# Patient Record
Sex: FEMALE | Employment: FULL TIME | URBAN - METROPOLITAN AREA
[De-identification: names, ages, dates, MRNs, and addresses within clinical notes are randomized per-mention and may not be internally consistent; named-entity substitution may affect disease eponyms.]

---

## 2020-12-22 ENCOUNTER — HOSPITAL ENCOUNTER (OUTPATIENT)
Dept: ULTRASOUND IMAGING | Facility: CLINIC | Age: 50
End: 2020-12-22
Attending: SPECIALIST
Payer: COMMERCIAL

## 2020-12-22 DIAGNOSIS — Z11.59 ENCOUNTER FOR SCREENING FOR OTHER VIRAL DISEASES: Primary | ICD-10-CM

## 2020-12-22 DIAGNOSIS — R92.8 ABNORMAL MAMMOGRAM: ICD-10-CM

## 2020-12-22 PROCEDURE — 76642 ULTRASOUND BREAST LIMITED: CPT | Mod: RT

## 2020-12-26 DIAGNOSIS — Z11.59 ENCOUNTER FOR SCREENING FOR OTHER VIRAL DISEASES: ICD-10-CM

## 2020-12-26 LAB
SARS-COV-2 RNA SPEC QL NAA+PROBE: NORMAL
SPECIMEN SOURCE: NORMAL

## 2020-12-26 PROCEDURE — U0003 INFECTIOUS AGENT DETECTION BY NUCLEIC ACID (DNA OR RNA); SEVERE ACUTE RESPIRATORY SYNDROME CORONAVIRUS 2 (SARS-COV-2) (CORONAVIRUS DISEASE [COVID-19]), AMPLIFIED PROBE TECHNIQUE, MAKING USE OF HIGH THROUGHPUT TECHNOLOGIES AS DESCRIBED BY CMS-2020-01-R: HCPCS | Performed by: SPECIALIST

## 2020-12-27 LAB
LABORATORY COMMENT REPORT: NORMAL
SARS-COV-2 RNA SPEC QL NAA+PROBE: NEGATIVE
SPECIMEN SOURCE: NORMAL

## 2020-12-29 ENCOUNTER — HOSPITAL ENCOUNTER (OUTPATIENT)
Dept: MAMMOGRAPHY | Facility: CLINIC | Age: 50
End: 2020-12-29
Attending: SPECIALIST
Payer: COMMERCIAL

## 2020-12-29 ENCOUNTER — HOSPITAL ENCOUNTER (OUTPATIENT)
Dept: ULTRASOUND IMAGING | Facility: CLINIC | Age: 50
End: 2020-12-29
Attending: SPECIALIST
Payer: COMMERCIAL

## 2020-12-29 DIAGNOSIS — R92.8 ABNORMAL MAMMOGRAM: ICD-10-CM

## 2020-12-29 PROCEDURE — 999N001020 HC STATISTIC H-SEND OUTS PREP: Performed by: SPECIALIST

## 2020-12-29 PROCEDURE — 88305 TISSUE EXAM BY PATHOLOGIST: CPT | Mod: 26 | Performed by: PATHOLOGY

## 2020-12-29 PROCEDURE — 999N000065 MA POST PROCEDURE RIGHT

## 2020-12-29 PROCEDURE — 250N000009 HC RX 250: Performed by: RADIOLOGY

## 2020-12-29 PROCEDURE — 88305 TISSUE EXAM BY PATHOLOGIST: CPT | Mod: TC | Performed by: SPECIALIST

## 2020-12-29 PROCEDURE — 88360 TUMOR IMMUNOHISTOCHEM/MANUAL: CPT | Mod: TC | Performed by: SPECIALIST

## 2020-12-29 PROCEDURE — 88342 IMHCHEM/IMCYTCHM 1ST ANTB: CPT | Mod: TC | Performed by: SPECIALIST

## 2020-12-29 PROCEDURE — 88377 M/PHMTRC ALYS ISHQUANT/SEMIQ: CPT | Mod: TC | Performed by: SPECIALIST

## 2020-12-29 PROCEDURE — 999N001019 HC STATISTIC H-FISH PROCESS B/S: Performed by: SPECIALIST

## 2020-12-29 PROCEDURE — 19083 BX BREAST 1ST LESION US IMAG: CPT | Mod: RT

## 2020-12-29 PROCEDURE — 88360 TUMOR IMMUNOHISTOCHEM/MANUAL: CPT | Mod: 26 | Performed by: PATHOLOGY

## 2020-12-29 PROCEDURE — 88377 M/PHMTRC ALYS ISHQUANT/SEMIQ: CPT | Mod: 26 | Performed by: MEDICAL GENETICS

## 2020-12-29 RX ADMIN — LIDOCAINE HYDROCHLORIDE 5 ML: 10 INJECTION, SOLUTION EPIDURAL; INFILTRATION; INTRACAUDAL; PERINEURAL at 11:23

## 2020-12-29 NOTE — DISCHARGE INSTRUCTIONS
Page 1 of 1  For informational purposes only. Not to replace the advice of your health care provider. Copyright   2010 [facility]. All rights reserved. [employer] [id number] - REV 02/16.  After Your Breast Biopsy   Bleeding or bruising  Slight bruising is normal. If you bleed through the bandage, put direct pressure on the breast for 10 minutes.   If the breast begins to swell, or you have a lot of bleeding after 10 minutes of pressure, call the doctor who ordered your exam. Or, go to the emergency room.   Bandages  Keep your bandage in place until tomorrow morning. Do not get it wet.   If you have small pieces of tape on the skin, leave them in place. They will fall off on their own, or you can remove them after 5 days.   Activity  You may shower the morning after the exam. No heavy activity (lifting, vacuuming) on the day of your exam. You may go back to normal activity the next day, unless you had a lot of bleeding or pain.  Discomfort  You may take Tylenol (acetaminophen) today for pain. Tomorrow, you may take an anti-inflammatory medicine (aspirin, ibuprofen, Motrin, Aleve, Advil), unless your doctor tells you not to.  Wear your bra overnight to support the breast. You may also use an ice pack: Place it over the area for 15-20 minutes several times a day.  Infection  Infection is rare. Symptoms include fever, redness, increasing pain and fluid draining from the biopsy site. If you have any of these symptoms, please call the doctor who ordered your exam.  Results  Results may take up to 5 business days. A nurse or doctor from the Breast Center will call with your results. We will also send the results to the doctor who ordered your biopsy.  If you have not heard your results in 5 days, please call the Breast Center.   Other instructions  ______________________________________________________________________________________________________________________________  Call your doctor if:    You have  bleeding that lasts more than 10 minutes.    You have pain that cannot be controlled.   You have signs of infection (fever, redness, drainage or other signs).   You have not received your results within 5 days.    Please call the Breast Center nurse navigator at [phone] if you have questions or concerns about your biopsy.

## 2020-12-31 ENCOUNTER — TELEPHONE (OUTPATIENT)
Dept: MAMMOGRAPHY | Facility: CLINIC | Age: 50
End: 2020-12-31

## 2020-12-31 NOTE — TELEPHONE ENCOUNTER
Pathology report reviewed with our breast radiologist Dr Dozier, who confirmed the recent breast imaging is concordant with the final surgical pathology results below.    I phoned Ms. Aaron, confirmed her full name, date of birth, and notified patient of Ultrasound Guided right Breast Biopsy results:   FINAL DIAGNOSIS:   Breast, right/10:00 - 5 cm from nipple, ultrasound-guided core biopsy:   - Invasive ductal carcinoma, histologic grade 3, basal - like phenotype   (CK 5/6 positive: > 80% of tumor   cells).   - Ductal carcinoma in situ, solid, high nuclear grade.   - Angiolymphatic invasion present.   - Estrogen and progesterone receptors by immunohistochemical stains and   HER2 by FISH ordered.     ESTROGEN RECEPTORS:         Negative.    < 1% of tumor cells stain   positive.     PROGESTERONE RECEPTORS:   Negative.    < 1% of tumor cells stain positive.     Patient states no problems with biopsy site.  Recommended follow up is surgical consult.  Surgical Consult has been arranged with Dr Desouza on 1-5-21 at 0930 am.  I also discussed with her the strong possiblility Dr Desouza would refer her to see a medical oncologist as well.    Patient has directions and phone numbers.    Questions were answered and I explained my role as Breast Care Nurse Coordinator in assisting her with appointments, resources and social support.  New diagnosis information packet will be available for patient at surgical consult.  I will follow up with the patient. She has my phone number if she has further questions.  Patient verbalized understanding and agrees with the plan of care.  Ordering provider- Dr Marrero has been notified of the results, recommendations for follow up and scheduled surgical consultation.  I will forward this note, along with the pathology results.      Jennifer Arshad, RN CBCN  Breast Care Nurse Coordinator  [facility]  [phone]   Patient with clinical picture c/w chest wall pain after a lot of physical activity this weekend. Will treat with NSAID's and can f/u with pediatrician

## 2021-01-04 ENCOUNTER — TELEPHONE (OUTPATIENT)
Dept: MAMMOGRAPHY | Facility: CLINIC | Age: 51
End: 2021-01-04

## 2021-01-04 NOTE — TELEPHONE ENCOUNTER
Patient calling for results of HER2, I have let her know this is still pending.     She is wondering when to see the oncologist.  I talked about helping her with this now or waiting until she sees Dr Desouza.  She will wait for her recommendations.  I have suggested she call back if she would like any assistance with this process.      Patient voices all questions at this time have been answered.

## 2021-01-05 ENCOUNTER — OFFICE VISIT (OUTPATIENT)
Dept: SURGERY | Facility: CLINIC | Age: 51
End: 2021-01-05
Payer: COMMERCIAL

## 2021-01-05 VITALS
DIASTOLIC BLOOD PRESSURE: 78 MMHG | OXYGEN SATURATION: 97 % | HEART RATE: 112 BPM | RESPIRATION RATE: 16 BRPM | SYSTOLIC BLOOD PRESSURE: 110 MMHG

## 2021-01-05 DIAGNOSIS — C50.911 INVASIVE DUCTAL CARCINOMA OF BREAST, STAGE 1, RIGHT (H): Primary | ICD-10-CM

## 2021-01-05 LAB — COPATH REPORT: NORMAL

## 2021-01-05 PROCEDURE — 99205 OFFICE O/P NEW HI 60 MIN: CPT | Performed by: SURGERY

## 2021-01-05 NOTE — LETTER
2021       Re: Hunter Aaron - 1970    Hunter Aaron is seen in consultation for right breast cancer, at the request of Karl Molina MD.     Hunter is a 50 year old female with right breast invasive ductal carcinoma, grade 3, basal like phenotype, ER -, SD -, [id number] - measuring 1 cm (US) at 9:00 and 5 cm from the nipple. Currently stage 1b     Plan:  We have had a detailed discussion on treatment of breast cancer which may include a combination of surgery, chemotherapy, endocrine therapy, radiation of which the use and timing depending on the specifics of their cancer.   We discussed her breast cancer is a more aggressive phenotype but based on size and imaging she is low stage     Surgical options were discussed including lumpectomy, mastectomy, bilateral mastectomies, sentinel lymph node biopsy with possible axillary node dissection and reconstructive options have been offered and discussed at length.  I feel the cosmetic outcome with lumpectomy would be acceptable.     We have had a detailed discussion regarding the recommended treatment for axillary lymph node metastases for women undergoing lumpectomy followed by radiation and for women undergoing mastectomy.     We have discussed the indication, alternatives, risks and expected recovery.  Specifically, we have discussed local recurrence of cancer, risk of future second primary breast cancer, incisions, scarring, breast deformity, volume loss, possible need for axillary lymphadenectomy, postoperative infection, anesthesia, bleeding, blood transfusion, postoperative restrictions and physical limitations.  We have discussed the recommended interventions and treatments for these outcomes.    All questions have been answered to the best of my ability.     Breast MRI:  yes- she already has one scheduled through [facility] for 2020  Oncology consult:  yes-given information for oncology referrals  Plastic surgery consult:   undecided  Radiation oncology consult:  undecided     Will discuss further when MRI completed tomorrow. She is getting a second opinion next week at [facility]  She is leaning towards bilateral mastectomies. I also gave her the referral information for Dr Salinas with plastic surgery. She will call us back after her 2nd opinion if she wants to proceed with her care here.      HPI:  Hunter Aaron is a 50 year old female who presents to discuss her recently diagnosed invasive ductal carcinoma.  This was diagnosed via right mammography and right breast ultrasound and core needle biopsy.      Breast mass noted:  none   Skin rashes, dimpling or nipple changes:  none  Nipple discharge:  none  Breast pain:   No     Previous breast biopsies: No  Previous cyst aspiration: No  Previous other breast surgery: No     Hormonal history:    First menstrual period: 12 years old  First live birth: 31 years old  Pre menopausal  HRT No  Fertility treatment: No     Family History:  Family history of breast cancer: No  Family history of ovarian cancer:  No  Family history of colon cancer: No  Family history of prostate cancer: No     Imaging:   All imaging studies reviewed by me.         Recent Results (from the past 744 hour(s))   US Breast Right     Narrative     US BREAST RIGHT LIMITED 1-3 QUADRANTS, 12/22/2020 3:30 PM     HISTORY:  Recalled from screening of 12/11/2020 regarding an oval mass  in the right upper outer breast.      COMPARISON:  Screening mammograms 12/11/2020, 8/19/2019      FINDINGS:  Targeted ultrasound shows a benign cyst at the 9:30  position, 4 cm from the nipple, measuring 2.6 x 1.0 x 2.5 cm. Adjacent  to this at the 10:00 position, 5 cm from the nipple, there is an  irregular hypoechoic nodule with peripheral vascularity by color  Doppler measuring 1.0 x 0.5 x 1.0 cm. Survey of the axilla shows no  evidence of adenopathy.        Impression     IMPRESSION: BI-RADS CATEGORY: 4 - Suspicious Abnormality-Biopsy  Should  Be Considered.  Hypoechoic nodule at the 10:00 position of the right breast.  Ultrasound-guided core biopsy is recommended. Results and  recommendation were discussed with the patient during her appointment.     RECOMMENDED FOLLOW-UP: Biopsy.     SRAVANTHI ARMANDO MD MA Post Procedure Right     Addendum: 1/4/2021     SCAR BORJA  [id number], [id number]     FINAL DIAGNOSIS: Grade 3 invasive ductal carcinoma with ductal  carcinoma in situ. Please see final path report.     Result is concordant.  Surgical consultation recommended.       Sravanthi Armando MD   Date of Addendum: 1/4/2021     SRAVANTHI ARMANDO MD        Narrative     ULTRASOUND-GUIDED RIGHT BREAST CORE BIOPSY;   CLIP PLACEMENT;   POSTPROCEDURE DIGITAL MAMMOGRAM; 12/29/2020 11:42 AM     INDICATION FOR PROCEDURE: Hypoechoic lesion at the 10:00 position of  the right breast.     PROCEDURE: Written informed consent is obtained from the patient prior  to the procedure. The risks and benefits are discussed and the patient  wishes to continue.  Approximately 3 mL lidocaine without epinephrine  was infiltrated for local anesthetic and a skin nick was made through  which a 13-gauge trocar was introduced via lateral to medial approach.   The needle tip was placed adjacent to the lesion. A series of 3  samples were obtained with a 14-gauge core-cutting needle. A  coil-shaped clip was then deployed to obie the lesion.      Postbiopsy unilateral digital mammogram of the right breast showed the  clip to be appropriately placed.  The patient tolerated the procedure  without difficulty and there was no immediate complication. Less than  5cc blood loss.  No pain following biopsy.        Impression     IMPRESSION: Successful right breast ultrasound-guided core biopsy and  clip placement.  Final pathology is pending.       SRAVANTHI ARMANDO MD         Percutaneous core needle biopsy:   Patient Name: SCAR BORJA   MR#: [id number]    Specimen #: [id number]   Collected: 12/29/2020   Received: 12/29/2020   Reported: 12/30/2020 14:24   Ordering Phy(s): SEBAS FRANCISCO   Additional Phy(s): SRAVANTHI ARMANDO   ORIGINAL REPORT:     SPECIMEN(S):   Right ultrasound guided breast needle biopsy, 10:00, 5.0cm from nipple     FINAL DIAGNOSIS:   Breast, right/10:00 - 5 cm from nipple, ultrasound-guided core biopsy:   - Invasive ductal carcinoma, histologic grade 3, basal - like phenotype   (CK 5/6 positive: > 80% of tumor   cells).   - Ductal carcinoma in situ, solid, high nuclear grade.   - Angiolymphatic invasion present.   - Estrogen and progesterone receptors by immunohistochemical stains and   HER2 by FISH ordered.  See addendum   and separate report for results.   INTERPRETATION:   IMMUNOHISTOCHEMICAL ANALYSIS FOR ESTROGEN AND PROGESTERONE RECEPTORS   RESULTS:     ESTROGEN RECEPTORS:         Negative.    < 1% of tumor cells stain   positive.     PROGESTERONE RECEPTORS:   Negative.    < 1% of tumor cells stain positive.     Ratio of HER2/EVITA-17 signals   Hunter Aaron:  See Interpretation below     Majority of tumor (~85% of tumor):  Group 5 (SAILAJA Negative)   Avg. number HER2 signals/nucleus:  1.8   Avg. number EVITA-17 signals/nucleus:  1.5   HER2/EVITA 17 ratio:  1.2     Subpopulation (~15% of tumor):  Group 5 (SAILAJA Negative)   Avg. number HER2 signals/nucleus:  3.9   Avg. number EVITA-17 signals/nucleus:  2.4   HER2/EVITA 17 ratio:  1.7       Past Surgical History:  Hysteroscopy in 20s      Social History:  Nonsmoker  Rare ETOH        ROS:  The 10 point review of systems is negative other than noted in the HPI and above.      PE:  Vitals: /78   Pulse 112   Resp 16   SpO2 97%   General appearance: well-nourished, sitting comfortably, no apparent distress  HEENT:  Head normocephalic and atraumatic, pupils equal and round, conjunctivae clear  Neck: Supple without thyromegaly or masses  Lungs: Respirations unlabored  CV: regular rate on pulse  check  Lymphatic: No cervical, or supraclavicular lymphadenopathy  Musculoskeletal:  Normal station and gait, extremities without edema  Neurologic: alert, speech is clear, moves all extremities with good strength  Psychiatric: Mood and affect are appropriate  Skin: Without lesions, rashes, or jaundice  Breast:               Symmetrical with no skin or nipple changes.                Contour is normal.              Parenchyma is moderately dense.              Masses- 10 o'clock - 2 cm diameter              Ecchymosis- right lateral              Incisional scar- none     Lymph:                         No supraclavicular/infraclavicular adenopathy.              Axillary adenopathy: none              Lila Desouza MD

## 2021-01-05 NOTE — PROGRESS NOTES
[facility]  New Patient Office Visit    Assessment:    Hunter Aaron is seen in consultation for right breast cancer, at the request of Karl Molina MD.    Hunter is a 50 year old female with right breast invasive ductal carcinoma, grade 3, basal like phenotype, ER -, [place] -, [id number] - measuring 1 cm (US) at 9:00 and 5 cm from the nipple. Currently stage 1b    Plan:  We have had a detailed discussion on treatment of breast cancer which may include a combination of surgery, chemotherapy, endocrine therapy, radiation of which the use and timing depending on the specifics of their cancer.   We discussed her breast cancer is a more aggressive phenotype but based on size and imaging she is low stage    Surgical options were discussed including lumpectomy, mastectomy, bilateral mastectomies, sentinel lymph node biopsy with possible axillary node dissection and reconstructive options have been offered and discussed at length.  I feel the cosmetic outcome with lumpectomy would be acceptable.     We have had a detailed discussion regarding the recommended treatment for axillary lymph node metastases for women undergoing lumpectomy followed by radiation and for women undergoing mastectomy.    We have discussed the indication, alternatives, risks and expected recovery.  Specifically, we have discussed local recurrence of cancer, risk of future second primary breast cancer, incisions, scarring, breast deformity, volume loss, possible need for axillary lymphadenectomy, postoperative infection, anesthesia, bleeding, blood transfusion, postoperative restrictions and physical limitations.  We have discussed the recommended interventions and treatments for these outcomes.    All questions have been answered to the best of my ability.    Breast MRI:  yes- she already has one scheduled through [facility] for 1/6/2020  Oncology consult:  yes-given information for oncology referrals  Plastic surgery consult:   undecided  Radiation oncology consult:  undecided    Will discuss further when MRI completed tomorrow. She is getting a second opinion next week at [facility]  She is leaning towards bilateral mastectomies. I also gave her the referral information for Dr Salinas with plastic surgery. She will call us back after her 2nd opinion if she wants to proceed with her care here.      HPI:  Hunter Aaron is a 50 year old female who presents to discuss her recently diagnosed invasive ductal carcinoma.  This was diagnosed via right mammography and right breast ultrasound and core needle biopsy.     Breast mass noted:  none   Skin rashes, dimpling or nipple changes:  none  Nipple discharge:  none  Breast pain:   No    Previous breast biopsies: No  Previous cyst aspiration: No  Previous other breast surgery: No    Hormonal history:    First menstrual period: 12 years old  First live birth: 31 years old  Pre menopausal  HRT No  Fertility treatment: No    Family History:  Family history of breast cancer: No  Family history of ovarian cancer:  No  Family history of colon cancer: No  Family history of prostate cancer: No    Imaging:   All imaging studies reviewed by me.    Recent Results (from the past 744 hour(s))   US Breast Right    Narrative    US BREAST RIGHT LIMITED 1-3 QUADRANTS, 12/22/2020 3:30 PM    HISTORY:  Recalled from screening of 12/11/2020 regarding an oval mass  in the right upper outer breast.     COMPARISON:  Screening mammograms 12/11/2020, 8/19/2019     FINDINGS:  Targeted ultrasound shows a benign cyst at the 9:30  position, 4 cm from the nipple, measuring 2.6 x 1.0 x 2.5 cm. Adjacent  to this at the 10:00 position, 5 cm from the nipple, there is an  irregular hypoechoic nodule with peripheral vascularity by color  Doppler measuring 1.0 x 0.5 x 1.0 cm. Survey of the axilla shows no  evidence of adenopathy.      Impression    IMPRESSION: BI-RADS CATEGORY: 4 - Suspicious Abnormality-Biopsy Should  Be  Considered.  Hypoechoic nodule at the 10:00 position of the right breast.  Ultrasound-guided core biopsy is recommended. Results and  recommendation were discussed with the patient during her appointment.    RECOMMENDED FOLLOW-UP: Biopsy.    SRAVANTHI ARMANDO MD MA Post Procedure Right    Addendum: 1/4/2021    SCAR BORJA  [id number], [id number]    FINAL DIAGNOSIS: Grade 3 invasive ductal carcinoma with ductal  carcinoma in situ. Please see final path report.    Result is concordant.  Surgical consultation recommended.      Sravanthi Armando MD   Date of Addendum: 1/4/2021    SRAVANTHI ARMANDO MD      Narrative    ULTRASOUND-GUIDED RIGHT BREAST CORE BIOPSY;   CLIP PLACEMENT;   POSTPROCEDURE DIGITAL MAMMOGRAM; 12/29/2020 11:42 AM    INDICATION FOR PROCEDURE: Hypoechoic lesion at the 10:00 position of  the right breast.    PROCEDURE: Written informed consent is obtained from the patient prior  to the procedure. The risks and benefits are discussed and the patient  wishes to continue.  Approximately 3 mL lidocaine without epinephrine  was infiltrated for local anesthetic and a skin nick was made through  which a 13-gauge trocar was introduced via lateral to medial approach.   The needle tip was placed adjacent to the lesion. A series of 3  samples were obtained with a 14-gauge core-cutting needle. A  coil-shaped clip was then deployed to obie the lesion.     Postbiopsy unilateral digital mammogram of the right breast showed the  clip to be appropriately placed.  The patient tolerated the procedure  without difficulty and there was no immediate complication. Less than  5cc blood loss.  No pain following biopsy.      Impression    IMPRESSION: Successful right breast ultrasound-guided core biopsy and  clip placement.  Final pathology is pending.      SRAVANTHI ARMANDO MD       Percutaneous core needle biopsy:   Patient Name: SCAR BORJA   MR#: [id number]   Specimen #: [id number]   Collected: 12/29/2020    Received: 12/29/2020   Reported: 12/30/2020 14:24   Ordering Phy(s): SEBAS FRANCISCO   Additional Phy(s): SRAVANTHI ARMANDO   ORIGINAL REPORT:     SPECIMEN(S):   Right ultrasound guided breast needle biopsy, 10:00, 5.0cm from nipple     FINAL DIAGNOSIS:   Breast, right/10:00 - 5 cm from nipple, ultrasound-guided core biopsy:   - Invasive ductal carcinoma, histologic grade 3, basal - like phenotype   (CK 5/6 positive: > 80% of tumor   cells).   - Ductal carcinoma in situ, solid, high nuclear grade.   - Angiolymphatic invasion present.   - Estrogen and progesterone receptors by immunohistochemical stains and   HER2 by FISH ordered.  See addendum   and separate report for results.   INTERPRETATION:   IMMUNOHISTOCHEMICAL ANALYSIS FOR ESTROGEN AND PROGESTERONE RECEPTORS   RESULTS:     ESTROGEN RECEPTORS:         Negative.    < 1% of tumor cells stain   positive.     PROGESTERONE RECEPTORS:   Negative.    < 1% of tumor cells stain positive.     Ratio of HER2/EVITA-17 signals   Hunter Aaron:  See Interpretation below     Majority of tumor (~85% of tumor):  Group 5 (SAILAJA Negative)   Avg. number HER2 signals/nucleus:  1.8   Avg. number EVITA-17 signals/nucleus:  1.5   HER2/EVITA 17 ratio:  1.2     Subpopulation (~15% of tumor):  Group 5 (SAILAJA Negative)   Avg. number HER2 signals/nucleus:  3.9   Avg. number EVITA-17 signals/nucleus:  2.4   HER2/EVITA 17 ratio:  1.7     Past Medical History:  No past medical history on file.  No current outpatient medications on file.     No current facility-administered medications for this visit.          Past Surgical History:  Hysteroscopy in 20s     Social History:  Nonsmoker  Rare ETOH      ROS:  The 10 point review of systems is negative other than noted in the HPI and above.      PE:  Vitals: /78   Pulse 112   Resp 16   SpO2 97%   General appearance: well-nourished, sitting comfortably, no apparent distress  HEENT:  Head normocephalic and atraumatic, pupils equal and round, conjunctivae  clear  Neck: Supple without thyromegaly or masses  Lungs: Respirations unlabored  CV: regular rate on pulse check  Lymphatic: No cervical, or supraclavicular lymphadenopathy  Musculoskeletal:  Normal station and gait, extremities without edema  Neurologic: alert, speech is clear, moves all extremities with good strength  Psychiatric: Mood and affect are appropriate  Skin: Without lesions, rashes, or jaundice  Breast:    Symmetrical with no skin or nipple changes.     Contour is normal.   Parenchyma is moderately dense.   Masses- 10 o'clock - 2 cm diameter   Ecchymosis- right lateral   Incisional scar- none    Lymph:       No supraclavicular/infraclavicular adenopathy.   Axillary adenopathy: none      This note may have been created using voice recognition software. Undetected word substitutions or other errors may have occurred.     60 minutes spent on the date of the encounter doing chart review, history and exam, documentation and further activities as noted above      Lila Desouza MD  01/05/21 9:29 AM     Please route or send letter to:  Referring Provider

## 2021-01-05 NOTE — PROGRESS NOTES
REVIEW OF SYSTEMS:    Constitutional: Negative    Cardiovascular: Negative    Respiratory: Negative    Endocrine:  Negative    Hematologic: Negative    Gastrointestinal: Negative    Genitourinary: Negative    Musculoskeletal: Negative      Integumentary / Breast : Negative    Neurologic: Negative                                                                         Breast Patients      BREAST PATIENTS (FEMALE)    At what age did your periods begin? 12    What was the date of your last menstrual period? 12/31    Have you begun menopause? Yes  Age Menopause began:  50    Are you using hormone replacement therapy?  No    Number of full-term pregnancies: 1    Did you nurse your children? Yes    Are you pregnant now? No    Do you have breast implants? No         BREAST PATIENTS (ALL)    Have you had a previous breast biopsy? Yes  Side: right  Date: 12/20    Have you had previous Breast Cancer? No

## 2021-01-07 LAB — COPATH REPORT: NORMAL

## 2021-01-11 ENCOUNTER — TRANSFERRED RECORDS (OUTPATIENT)
Dept: HEALTH INFORMATION MANAGEMENT | Facility: CLINIC | Age: 51
End: 2021-01-11

## 2021-01-15 ENCOUNTER — HEALTH MAINTENANCE LETTER (OUTPATIENT)
Age: 51
End: 2021-01-15

## 2021-09-18 ENCOUNTER — HEALTH MAINTENANCE LETTER (OUTPATIENT)
Age: 51
End: 2021-09-18

## 2022-03-05 ENCOUNTER — HEALTH MAINTENANCE LETTER (OUTPATIENT)
Age: 52
End: 2022-03-05

## 2022-04-01 ENCOUNTER — TRANSFERRED RECORDS (OUTPATIENT)
Dept: HEALTH INFORMATION MANAGEMENT | Facility: CLINIC | Age: 52
End: 2022-04-01
Payer: COMMERCIAL

## 2022-11-20 ENCOUNTER — HEALTH MAINTENANCE LETTER (OUTPATIENT)
Age: 52
End: 2022-11-20

## 2023-04-15 ENCOUNTER — HEALTH MAINTENANCE LETTER (OUTPATIENT)
Age: 53
End: 2023-04-15

## 2023-08-13 ENCOUNTER — OFFICE VISIT (OUTPATIENT)
Dept: URGENT CARE | Facility: URGENT CARE | Age: 53
End: 2023-08-13
Payer: COMMERCIAL

## 2023-08-13 VITALS
DIASTOLIC BLOOD PRESSURE: 78 MMHG | SYSTOLIC BLOOD PRESSURE: 106 MMHG | HEART RATE: 111 BPM | WEIGHT: 154.3 LBS | HEIGHT: 60 IN | OXYGEN SATURATION: 99 % | BODY MASS INDEX: 30.29 KG/M2 | TEMPERATURE: 101.3 F | RESPIRATION RATE: 20 BRPM

## 2023-08-13 DIAGNOSIS — J45.20 INTERMITTENT ASTHMA, UNSPECIFIED ASTHMA SEVERITY, UNSPECIFIED WHETHER COMPLICATED: ICD-10-CM

## 2023-08-13 DIAGNOSIS — Z20.822 SUSPECTED 2019 NOVEL CORONAVIRUS INFECTION: ICD-10-CM

## 2023-08-13 DIAGNOSIS — U07.1 INFECTION DUE TO 2019 NOVEL CORONAVIRUS: Primary | ICD-10-CM

## 2023-08-13 PROBLEM — C50.411 MALIGNANT NEOPLASM OF UPPER-OUTER QUADRANT OF RIGHT BREAST IN FEMALE, ESTROGEN RECEPTOR NEGATIVE (H): Status: ACTIVE | Noted: 2021-01-01

## 2023-08-13 PROBLEM — T78.05XA ANAPHYLAXIS DUE TO TREE NUT: Status: ACTIVE | Noted: 2023-08-13

## 2023-08-13 PROBLEM — M25.512 CHRONIC LEFT SHOULDER PAIN: Status: ACTIVE | Noted: 2023-03-25

## 2023-08-13 PROBLEM — Z15.01 BRCA2 POSITIVE: Status: ACTIVE | Noted: 2021-03-25

## 2023-08-13 PROBLEM — Z17.1 MALIGNANT NEOPLASM OF UPPER-OUTER QUADRANT OF RIGHT BREAST IN FEMALE, ESTROGEN RECEPTOR NEGATIVE (H): Status: ACTIVE | Noted: 2021-01-01

## 2023-08-13 PROBLEM — Z15.09 BRCA2 POSITIVE: Status: ACTIVE | Noted: 2021-03-25

## 2023-08-13 PROBLEM — Z90.13 S/P BILATERAL MASTECTOMY: Status: ACTIVE | Noted: 2021-07-21

## 2023-08-13 PROBLEM — G89.29 CHRONIC LEFT SHOULDER PAIN: Status: ACTIVE | Noted: 2023-03-25

## 2023-08-13 PROBLEM — F41.9 ANXIETY: Status: ACTIVE | Noted: 2021-10-29

## 2023-08-13 PROBLEM — Z90.10 ABSENCE OF BREAST: Status: ACTIVE | Noted: 2023-02-09

## 2023-08-13 PROBLEM — E78.2 MIXED HYPERLIPIDEMIA: Status: ACTIVE | Noted: 2023-01-04

## 2023-08-13 PROCEDURE — 99204 OFFICE O/P NEW MOD 45 MIN: CPT | Performed by: PHYSICIAN ASSISTANT

## 2023-08-13 PROCEDURE — 87635 SARS-COV-2 COVID-19 AMP PRB: CPT | Performed by: PHYSICIAN ASSISTANT

## 2023-08-13 RX ORDER — HYDROCODONE BITARTRATE AND ACETAMINOPHEN 5; 325 MG/1; MG/1
TABLET ORAL
COMMUNITY
Start: 2023-07-04 | End: 2023-08-13

## 2023-08-13 RX ORDER — ONDANSETRON 4 MG/1
TABLET, ORALLY DISINTEGRATING ORAL
COMMUNITY
Start: 2023-07-04 | End: 2023-08-13

## 2023-08-13 RX ORDER — KETOROLAC TROMETHAMINE 10 MG/1
1 TABLET, FILM COATED ORAL EVERY 6 HOURS PRN
COMMUNITY
Start: 2023-07-04 | End: 2023-08-13

## 2023-08-13 RX ORDER — TAMSULOSIN HYDROCHLORIDE 0.4 MG/1
CAPSULE ORAL
COMMUNITY
Start: 2023-07-04 | End: 2023-08-13

## 2023-08-13 RX ORDER — ACETAMINOPHEN 500 MG
1000 TABLET ORAL
COMMUNITY
Start: 2021-11-20

## 2023-08-13 RX ORDER — CHLORHEXIDINE GLUCONATE ORAL RINSE 1.2 MG/ML
SOLUTION DENTAL
COMMUNITY
Start: 2023-03-24

## 2023-08-13 RX ORDER — ATORVASTATIN CALCIUM 40 MG/1
40 TABLET, FILM COATED ORAL DAILY
COMMUNITY

## 2023-08-13 RX ORDER — DIAZEPAM 2 MG
2 TABLET ORAL
COMMUNITY
Start: 2022-09-19 | End: 2023-08-13

## 2023-08-13 RX ORDER — VITAMIN B COMPLEX
1000 TABLET ORAL
COMMUNITY

## 2023-08-13 RX ORDER — ALBUTEROL SULFATE 90 UG/1
2 AEROSOL, METERED RESPIRATORY (INHALATION) EVERY 6 HOURS PRN
Qty: 18 G | Refills: 0 | Status: SHIPPED | OUTPATIENT
Start: 2023-08-13 | End: 2023-09-12

## 2023-08-13 NOTE — PATIENT INSTRUCTIONS
August 13, 2023 [facility] Plan:     -I prescribed the Paxlovid prescription you requested, due to your reported history of intermittent asthma, and your reported immunocompromise related to past chemotherapy,and your positive home Covid test result today.     -I prescribed Albuterol inhaler for you do to your report of wheezing with upper respiratory infections. I do not hear any wheezing now, but you should start using the Albuterol if you develop wheezing.     -You need to STOP your LIPITOR (also called atorvastatin 40 mg) medication while you are taking the Paxlovid.     Please discuss this further with the pharmacist when you  your medication.     -Go to the emergency room if you develop any severe difficulty breathing (as we discussed today)

## 2023-08-13 NOTE — PROGRESS NOTES
ASSESSMENT/PLAN:    (U07.1) Infection due to 2019 novel coronavirus  (primary encounter diagnosis)      MDM: Acute onset upper respiratory symptoms, and reported positive home COVID test, and a 53-year-old female with reported immune compromise related to her past chemotherapy treatments, and with a history of intermittent asthma (with upper respiratory infection trigger).  No evidence of secondary bacterial infection on exam. No evidence of respiratory distress or other medical distress requiring emergent intervention at this time.  Paxlovid was prescribed, as per patient wishes.  Normal kidney and liver function test result results were confirmed on review of epic (comprehensive metabolic panel on 5/19/2023).  I agree she meets high risk criteria.  Patient is educated she needs to stop her Lipitor/atorvastatin while taking the Paxlovid.  Albuterol was prescribed based on her reported past history of wheezing with URI trigger.  Criteria for urgent and emergent follow-up were reviewed with patient today.  Please also see the below after visit summary/plan (which I reviewed with patient verbally, provided in printed form for home review, and placed in her MyChart for home review).          Plan: nirmatrelvir and ritonavir (PAXLOVID) 300         mg/100 mg therapy pack, albuterol (PROAIR         HFA/PROVENTIL HFA/VENTOLIN HFA) 108 (90 Base)         MCG/ACT inhaler    After Visit Summary/Plan-    August 13, 2023 [facility]:     -I prescribed the Paxlovid prescription you requested, due to your reported history of intermittent asthma, and your reported immunocompromise related to past chemotherapy,and your positive home Covid test result today.     -I prescribed Albuterol inhaler for you do to your report of wheezing with upper respiratory infections. I do not hear any wheezing now, but you should start using the Albuterol if you develop wheezing.     -You need to STOP your LIPITOR (also called atorvastatin  "40 mg) medication while you are taking the Paxlovid.     Please discuss this further with the pharmacist when you  your medication.     -Go to the emergency room if you develop any severe difficulty breathing (as we discussed today)     (Z20.822) Suspected 2019 novel coronavirus infection    Plan: Symptomatic COVID-19 Virus (Coronavirus) by PCR        Nose      (J45.20) Intermittent asthma, unspecified asthma severity, unspecified whether complicated    Plan: nirmatrelvir and ritonavir (PAXLOVID) 300         mg/100 mg therapy pack, albuterol (PROAIR         HFA/PROVENTIL HFA/VENTOLIN HFA) 108 (90 Base)         MCG/ACT inhaler        This progress note has been dictated, with use of voice recognition software. Any grammatical, typographical, or context errors are unintentional and inherent to use of voice recognition software.    -----------------------    Chief Complaint   Patient presents with    Urgent Care    Suspected Covid     Pt just got back from a cruise and got a notice that one of the friend tested positive for COVID. Per pt- high risk as she has BRCA2. Woke today with tested positive for Covid at home today. .         SUBJECTIVE:    Hunter Aaron presents to urgent care today requesting a prescription for Paxlovid.  Patient states she just got back from a cruise ([place] in the [facility]).  She developed a an acute fever and cough yesterday.  Patient tells me she took a home COVID test today (with positive result).  She was also notified that one of her travel mates tested positive for COVID.    Patient reports she was told by her oncologist that she is \"high risk\" and has \"a low immune system\" following her chemotherapy for cancer 2 years ago.  Additionally, patient states she has a history of intermittent asthma (primarily needing albuterol only when she has cough/colds).    ROS: No high fever or shaking chills. No associated severe cough,coughing up of blood, blue lips/fingers/toes, or severe " "shortness of breath (confirms they are still able to to all self cares and activities of daily living). No acute fainting, chest pain, racing or irregular heartbeats. No acute onset abdominal pain, nausea, vomiting, or diarrhea. No severe body aches, severe headaches, rashes, hives, joint swelling or other acute illness symptoms.     No past medical history on file.    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       Allergies   Allergen Reactions    Nutritional Supplements [Food Allergy Formula] Anaphylaxis    Nuts Anaphylaxis    Cephalexin      Other Reaction(s): Gastrointestinal         OBJECTIVE:  /78   Pulse 111   Temp (!) 101.3  F (38.5  C) (Tympanic)   Resp 20   Ht 1.511 m (4' 11.5\")   Wt 70 kg (154 lb 4.8 oz)   SpO2 99%   BMI 30.64 kg/m          General appearance: alert and no apparent distress  Skin color is uniform in color and without rash.  HEENT:   Conjunctiva not injected.  Sclera clear.  Left TM is normal: no effusions, no erythema, and normal landmarks.  Right TM is normal: no effusions, no erythema, and normal landmarks.  Nasal mucosa is Congested  Oropharyngeal exam is normal: no lesions, erythema, adenopathy or exudate.  NECK: Trachea is midline. Neck is supple, FROM with no adenopathy  CARDIAC:NORMAL - regular rate and rhythm without murmur.  RESP: No increased work of breathing at rest--able to speak full multiple sequential full " sentences without pause. No wheezes. No rhonchi.  No rales. Still moving air well into all listening areas today.   NEURO: Alert and oriented.  Normal speech and mentation.  CN II/XII grossly intact.  Gait within normal limits.          Review of Epic:    Normal kidney and liver function test results reviewed in [facility] 5/19/2023

## 2023-08-14 LAB — SARS-COV-2 RNA RESP QL NAA+PROBE: POSITIVE

## 2023-08-15 NOTE — RESULT ENCOUNTER NOTE
[facility]. Patient was given Rx Paxlovid on 8/13/23 UC visit

## 2024-04-07 ENCOUNTER — HEALTH MAINTENANCE LETTER (OUTPATIENT)
Age: 54
End: 2024-04-07

## 2025-04-19 ENCOUNTER — HEALTH MAINTENANCE LETTER (OUTPATIENT)
Age: 55
End: 2025-04-19